# Patient Record
Sex: FEMALE | Race: WHITE | Employment: UNEMPLOYED | URBAN - METROPOLITAN AREA
[De-identification: names, ages, dates, MRNs, and addresses within clinical notes are randomized per-mention and may not be internally consistent; named-entity substitution may affect disease eponyms.]

---

## 2017-01-14 ENCOUNTER — HOSPITAL ENCOUNTER (EMERGENCY)
Facility: HOSPITAL | Age: 62
Discharge: HOME | End: 2017-01-14
Attending: EMERGENCY MEDICINE
Payer: MEDICAID

## 2017-01-14 ENCOUNTER — APPOINTMENT (OUTPATIENT)
Dept: GENERAL RADIOLOGY | Facility: HOSPITAL | Age: 62
End: 2017-01-14
Attending: EMERGENCY MEDICINE
Payer: MEDICAID

## 2017-01-14 VITALS
BODY MASS INDEX: 24.39 KG/M2 | RESPIRATION RATE: 18 BRPM | HEIGHT: 65.75 IN | DIASTOLIC BLOOD PRESSURE: 79 MMHG | WEIGHT: 149.94 LBS | HEART RATE: 86 BPM | SYSTOLIC BLOOD PRESSURE: 126 MMHG | TEMPERATURE: 100 F | OXYGEN SATURATION: 96 %

## 2017-01-14 DIAGNOSIS — J18.9 COMMUNITY ACQUIRED PNEUMONIA: Primary | ICD-10-CM

## 2017-01-14 PROCEDURE — 71020 XR CHEST PA + LAT CHEST (CPT=71020): CPT

## 2017-01-14 PROCEDURE — 99283 EMERGENCY DEPT VISIT LOW MDM: CPT

## 2017-01-14 RX ORDER — ALBUTEROL SULFATE 90 UG/1
2 AEROSOL, METERED RESPIRATORY (INHALATION) EVERY 4 HOURS PRN
Qty: 1 INHALER | Refills: 0 | Status: SHIPPED | OUTPATIENT
Start: 2017-01-14 | End: 2017-02-13

## 2017-01-14 RX ORDER — AZITHROMYCIN 250 MG/1
TABLET, FILM COATED ORAL
Qty: 1 PACKAGE | Refills: 0 | Status: SHIPPED | OUTPATIENT
Start: 2017-01-14 | End: 2017-01-19

## 2017-01-15 NOTE — ED NOTES
Presents with persistent cough and fevers at home for past few weeks, productive with yellowish phlegm, no recent travel, no chest pain.

## 2017-01-15 NOTE — ED PROVIDER NOTES
Patient Seen in: [facility] Emergency Department    History   Patient presents with:  Cough/URI  Fever Sepsis (infectious)    Stated Complaint: COUGH, CHEST CONGESTION, FEVER    HPI  Patient is a 19-year-old female who states over the past 2 weeks s Temp(Src) 99.5 °F (37.5 °C) (Oral)  Resp 18  Ht 167 cm (5' 5.75\")  Wt 68 kg  BMI 24.38 kg/m2  SpO2 96%        Physical Exam  GENERAL: Patient resting comfortably on the cart in no acute distress.   HEENT: Extraocular muscles intact, pupils equal round reac

## 2017-02-10 ENCOUNTER — APPOINTMENT (OUTPATIENT)
Dept: GENERAL RADIOLOGY | Age: 62
End: 2017-02-10
Attending: FAMILY MEDICINE
Payer: MEDICAID

## 2017-02-10 ENCOUNTER — HOSPITAL ENCOUNTER (OUTPATIENT)
Age: 62
Discharge: HOME | End: 2017-02-10
Attending: FAMILY MEDICINE
Payer: MEDICAID

## 2017-02-10 VITALS
DIASTOLIC BLOOD PRESSURE: 67 MMHG | OXYGEN SATURATION: 96 % | HEART RATE: 68 BPM | SYSTOLIC BLOOD PRESSURE: 118 MMHG | TEMPERATURE: 98 F | RESPIRATION RATE: 20 BRPM

## 2017-02-10 DIAGNOSIS — S86.912A KNEE STRAIN, LEFT, INITIAL ENCOUNTER: Primary | ICD-10-CM

## 2017-02-10 DIAGNOSIS — M19.90 OSTEOARTHRITIS, UNSPECIFIED OSTEOARTHRITIS TYPE, UNSPECIFIED SITE: ICD-10-CM

## 2017-02-10 PROCEDURE — 99213 OFFICE O/P EST LOW 20 MIN: CPT

## 2017-02-10 PROCEDURE — 73560 X-RAY EXAM OF KNEE 1 OR 2: CPT

## 2017-02-10 NOTE — ED PROVIDER NOTES
Patient Seen in: [facility]    History   Patient presents with:  Knee Pain    Stated Complaint: KNEE PAIN LEFT SIDE    HPI  80-year-old female coming in with daughter, declining the need for , once daughter to transla Current:/67 mmHg  Pulse 68  Temp(Src) 97.6 °F (36.4 °C) (Temporal)  Resp 20  SpO2 96%        Physical Exam  General appearance: alert, appears stated age and cooperative  Neck: no adenopathy and supple, symmetrical, trachea midline  Lungs: No t tightness and pressure at proximal end on anterior side. FINDINGS:  No acute fractures or osseous lesions. Patellar femoral relationship is within normal limits. Small joint effusion. Mild osteoarthritic changes.        2/10/2017  CONCLUSION:  No acute f

## 2017-02-10 NOTE — ED INITIAL ASSESSMENT (HPI)
Here for eval of left knee pain that occurred in the fall while bending down, sts that she felt a \"pop\". Sts that now it is still pinching.

## 2018-07-14 ENCOUNTER — APPOINTMENT (OUTPATIENT)
Dept: GENERAL RADIOLOGY | Age: 63
End: 2018-07-14
Attending: FAMILY MEDICINE
Payer: MEDICAID

## 2018-07-14 ENCOUNTER — HOSPITAL ENCOUNTER (OUTPATIENT)
Age: 63
Discharge: HOME | End: 2018-07-14
Attending: FAMILY MEDICINE
Payer: MEDICAID

## 2018-07-14 VITALS
OXYGEN SATURATION: 95 % | HEART RATE: 67 BPM | SYSTOLIC BLOOD PRESSURE: 124 MMHG | RESPIRATION RATE: 18 BRPM | TEMPERATURE: 98 F | DIASTOLIC BLOOD PRESSURE: 80 MMHG

## 2018-07-14 DIAGNOSIS — S92.514A CLOSED NONDISPLACED FRACTURE OF PROXIMAL PHALANX OF LESSER TOE OF RIGHT FOOT, INITIAL ENCOUNTER: Primary | ICD-10-CM

## 2018-07-14 PROCEDURE — 73660 X-RAY EXAM OF TOE(S): CPT | Performed by: FAMILY MEDICINE

## 2018-07-14 PROCEDURE — 28510 TREATMENT OF TOE FRACTURE: CPT

## 2018-07-14 PROCEDURE — 99213 OFFICE O/P EST LOW 20 MIN: CPT

## 2018-07-14 NOTE — ED INITIAL ASSESSMENT (HPI)
Patient presents with complaints of a right toe injury this past Thursday. States that she hit her 3 rd right toe on a wooden door. CMS adequate.

## 2018-07-14 NOTE — ED PROVIDER NOTES
Patient Seen in: [facility]    History   Patient presents with:  Lower Extremity Injury (musculoskeletal): right 3rd toe injury    Stated Complaint: RIGHT MIDDLE TOE SWOLLEN/BRUISED    HPI    This 51-year-old female presents to and swelling at the proximal phalanx of the right third toe. CMS is otherwise intact to the right foot. She has no tenderness over the metatarsals. She has normal range of motion of the right ankle and knee. SKIN: Warm and dry.       ED Course   Labs Re diagnosis)    Disposition:  Discharge  7/14/2018  4:30 pm    Follow-up:  Gutierrez Castañeda DO  [address]  [phone]    Schedule an appointment as soon as possible for a visit in 1 week  If symptoms worsen        Medicati

## 2018-12-05 ENCOUNTER — HOSPITAL ENCOUNTER (OUTPATIENT)
Age: 63
Discharge: HOME | End: 2018-12-05
Attending: EMERGENCY MEDICINE
Payer: MEDICAID

## 2018-12-05 VITALS
HEIGHT: 65.35 IN | HEART RATE: 86 BPM | WEIGHT: 141.13 LBS | DIASTOLIC BLOOD PRESSURE: 74 MMHG | TEMPERATURE: 98 F | OXYGEN SATURATION: 97 % | BODY MASS INDEX: 23.23 KG/M2 | RESPIRATION RATE: 18 BRPM | SYSTOLIC BLOOD PRESSURE: 126 MMHG

## 2018-12-05 DIAGNOSIS — J40 BRONCHITIS: Primary | ICD-10-CM

## 2018-12-05 DIAGNOSIS — J98.01 BRONCHOSPASM: ICD-10-CM

## 2018-12-05 PROCEDURE — 99214 OFFICE O/P EST MOD 30 MIN: CPT

## 2018-12-05 PROCEDURE — 99213 OFFICE O/P EST LOW 20 MIN: CPT

## 2018-12-05 RX ORDER — ALBUTEROL SULFATE 90 UG/1
2 AEROSOL, METERED RESPIRATORY (INHALATION) EVERY 4 HOURS PRN
Qty: 1 INHALER | Refills: 0 | Status: SHIPPED | OUTPATIENT
Start: 2018-12-05 | End: 2019-01-04

## 2018-12-05 RX ORDER — AZITHROMYCIN 250 MG/1
TABLET, FILM COATED ORAL
Qty: 1 PACKAGE | Refills: 0 | Status: SHIPPED | OUTPATIENT
Start: 2018-12-05 | End: 2018-12-10 | Stop reason: ALTCHOICE

## 2018-12-06 NOTE — ED INITIAL ASSESSMENT (HPI)
Patient presents with cc of cough productive of green since Saturday. H/o pneumonia. Tmax 100.9 at home. Did get flu shot.

## 2018-12-06 NOTE — ED PROVIDER NOTES
Patient Seen in: [facility]    History   No chief complaint on file. Stated Complaint: fever, cough    HPI    Patient was seen in the emergency department in January 2017 with productive cough and fever.   Patient is a former There is wheeze on forced expiration with cough  Heart: Normal S1 and S2, without murmur or rub. Neurologic:  Mental status as above.   Patient moves all extremities with good strength        ED Course   Labs Reviewed - No data to display     Patient lik

## 2018-12-10 PROCEDURE — 88175 CYTOPATH C/V AUTO FLUID REDO: CPT | Performed by: FAMILY MEDICINE

## 2018-12-10 PROCEDURE — 87624 HPV HI-RISK TYP POOLED RSLT: CPT | Performed by: FAMILY MEDICINE

## 2019-05-16 PROCEDURE — 86803 HEPATITIS C AB TEST: CPT | Performed by: FAMILY MEDICINE

## 2023-05-08 ENCOUNTER — APPOINTMENT (OUTPATIENT)
Dept: GENERAL RADIOLOGY | Age: 68
End: 2023-05-08
Attending: NURSE PRACTITIONER
Payer: MEDICAID

## 2023-05-08 ENCOUNTER — HOSPITAL ENCOUNTER (OUTPATIENT)
Age: 68
Discharge: HOME | End: 2023-05-08
Payer: MEDICAID

## 2023-05-08 VITALS
DIASTOLIC BLOOD PRESSURE: 68 MMHG | BODY MASS INDEX: 22 KG/M2 | HEART RATE: 82 BPM | RESPIRATION RATE: 18 BRPM | OXYGEN SATURATION: 95 % | WEIGHT: 130 LBS | TEMPERATURE: 98 F | SYSTOLIC BLOOD PRESSURE: 130 MMHG

## 2023-05-08 DIAGNOSIS — J18.9 LINGULAR PNEUMONIA: Primary | ICD-10-CM

## 2023-05-08 LAB
POCT INFLUENZA A: NEGATIVE
POCT INFLUENZA B: NEGATIVE
SARS-COV-2 RNA RESP QL NAA+PROBE: NOT DETECTED

## 2023-05-08 PROCEDURE — 87502 INFLUENZA DNA AMP PROBE: CPT | Performed by: EMERGENCY MEDICINE

## 2023-05-08 PROCEDURE — 99204 OFFICE O/P NEW MOD 45 MIN: CPT

## 2023-05-08 PROCEDURE — 94640 AIRWAY INHALATION TREATMENT: CPT

## 2023-05-08 PROCEDURE — 71046 X-RAY EXAM CHEST 2 VIEWS: CPT | Performed by: NURSE PRACTITIONER

## 2023-05-08 RX ORDER — ALBUTEROL SULFATE 90 UG/1
2 AEROSOL, METERED RESPIRATORY (INHALATION) ONCE
Status: COMPLETED | OUTPATIENT
Start: 2023-05-08 | End: 2023-05-08

## 2023-05-08 RX ORDER — AZITHROMYCIN 250 MG/1
TABLET, FILM COATED ORAL
Qty: 6 TABLET | Refills: 0 | Status: SHIPPED | OUTPATIENT
Start: 2023-05-08 | End: 2023-05-13

## 2023-05-08 RX ORDER — AMOXICILLIN AND CLAVULANATE POTASSIUM 875; 125 MG/1; MG/1
1 TABLET, FILM COATED ORAL 2 TIMES DAILY
Qty: 14 TABLET | Refills: 0 | Status: SHIPPED | OUTPATIENT
Start: 2023-05-08 | End: 2023-05-15

## 2023-05-08 NOTE — DISCHARGE INSTRUCTIONS
Take an over-the-counter probiotic daily while on the antibiotics. Take the other inhaler, 2 puffs every 4 hours as needed for cough/chest congestion. Call your doctor in 2 days for follow-up. Please read the attached discharge instructions. Go to your nearest ER for new or worsening symptoms.

## 2023-05-08 NOTE — ED INITIAL ASSESSMENT (HPI)
Pt here c/o cough for last 5 days. Congestion. States has a fever at night, 100.4. Took ibuprofen this am.   Pain to chest when coughing. Denies any sob.

## 2023-05-27 ENCOUNTER — HOSPITAL ENCOUNTER (OUTPATIENT)
Age: 68
Discharge: ANOTHER HOSPITAL | End: 2023-05-27
Attending: EMERGENCY MEDICINE
Payer: MEDICAID

## 2023-05-27 ENCOUNTER — APPOINTMENT (OUTPATIENT)
Dept: GENERAL RADIOLOGY | Facility: HOSPITAL | Age: 68
End: 2023-05-27
Payer: MEDICAID

## 2023-05-27 ENCOUNTER — HOSPITAL ENCOUNTER (EMERGENCY)
Facility: HOSPITAL | Age: 68
Discharge: HOME | End: 2023-05-27
Attending: STUDENT IN AN ORGANIZED HEALTH CARE EDUCATION/TRAINING PROGRAM
Payer: MEDICAID

## 2023-05-27 VITALS
DIASTOLIC BLOOD PRESSURE: 82 MMHG | BODY MASS INDEX: 24.02 KG/M2 | RESPIRATION RATE: 14 BRPM | SYSTOLIC BLOOD PRESSURE: 113 MMHG | TEMPERATURE: 98 F | HEIGHT: 65.75 IN | WEIGHT: 147.69 LBS | HEART RATE: 68 BPM | OXYGEN SATURATION: 99 %

## 2023-05-27 VITALS
DIASTOLIC BLOOD PRESSURE: 66 MMHG | OXYGEN SATURATION: 95 % | HEART RATE: 102 BPM | RESPIRATION RATE: 18 BRPM | SYSTOLIC BLOOD PRESSURE: 116 MMHG | TEMPERATURE: 98 F

## 2023-05-27 DIAGNOSIS — R07.89 CHEST PRESSURE: Primary | ICD-10-CM

## 2023-05-27 DIAGNOSIS — R07.9 CHEST PAIN OF UNCERTAIN ETIOLOGY: Primary | ICD-10-CM

## 2023-05-27 LAB
ALBUMIN SERPL-MCNC: 3.8 G/DL (ref 3.4–5)
ALBUMIN/GLOB SERPL: 1 {RATIO} (ref 1–2)
ALP LIVER SERPL-CCNC: 72 U/L
ALT SERPL-CCNC: 13 U/L
ANION GAP SERPL CALC-SCNC: 4 MMOL/L (ref 0–18)
AST SERPL-CCNC: 17 U/L (ref 15–37)
BASOPHILS # BLD AUTO: 0.03 X10(3) UL (ref 0–0.2)
BASOPHILS NFR BLD AUTO: 0.5 %
BILIRUB SERPL-MCNC: 1.1 MG/DL (ref 0.1–2)
BUN BLD-MCNC: 17 MG/DL (ref 7–18)
CALCIUM BLD-MCNC: 9.3 MG/DL (ref 8.5–10.1)
CHLORIDE SERPL-SCNC: 109 MMOL/L (ref 98–112)
CO2 SERPL-SCNC: 26 MMOL/L (ref 21–32)
CREAT BLD-MCNC: 0.92 MG/DL
EOSINOPHIL # BLD AUTO: 0.07 X10(3) UL (ref 0–0.7)
EOSINOPHIL NFR BLD AUTO: 1.2 %
ERYTHROCYTE [DISTWIDTH] IN BLOOD BY AUTOMATED COUNT: 12.1 %
GFR SERPLBLD BASED ON 1.73 SQ M-ARVRAT: 68 ML/MIN/1.73M2 (ref 60–?)
GLOBULIN PLAS-MCNC: 3.7 G/DL (ref 2.8–4.4)
GLUCOSE BLD-MCNC: 96 MG/DL (ref 70–99)
HCT VFR BLD AUTO: 42.4 %
HGB BLD-MCNC: 13.8 G/DL
IMM GRANULOCYTES # BLD AUTO: 0.01 X10(3) UL (ref 0–1)
IMM GRANULOCYTES NFR BLD: 0.2 %
LYMPHOCYTES # BLD AUTO: 1.92 X10(3) UL (ref 1–4)
LYMPHOCYTES NFR BLD AUTO: 33 %
MCH RBC QN AUTO: 28.8 PG (ref 26–34)
MCHC RBC AUTO-ENTMCNC: 32.5 G/DL (ref 31–37)
MCV RBC AUTO: 88.3 FL
MONOCYTES # BLD AUTO: 0.55 X10(3) UL (ref 0.1–1)
MONOCYTES NFR BLD AUTO: 9.5 %
NEUTROPHILS # BLD AUTO: 3.24 X10 (3) UL (ref 1.5–7.7)
NEUTROPHILS # BLD AUTO: 3.24 X10(3) UL (ref 1.5–7.7)
NEUTROPHILS NFR BLD AUTO: 55.6 %
OSMOLALITY SERPL CALC.SUM OF ELEC: 289 MOSM/KG (ref 275–295)
PLATELET # BLD AUTO: 301 10(3)UL (ref 150–450)
POTASSIUM SERPL-SCNC: 4.1 MMOL/L (ref 3.5–5.1)
PROT SERPL-MCNC: 7.5 G/DL (ref 6.4–8.2)
RBC # BLD AUTO: 4.8 X10(6)UL
SODIUM SERPL-SCNC: 139 MMOL/L (ref 136–145)
TROPONIN I HIGH SENSITIVITY: <3 NG/L
WBC # BLD AUTO: 5.8 X10(3) UL (ref 4–11)

## 2023-05-27 PROCEDURE — 99284 EMERGENCY DEPT VISIT MOD MDM: CPT

## 2023-05-27 PROCEDURE — 99213 OFFICE O/P EST LOW 20 MIN: CPT

## 2023-05-27 PROCEDURE — 71045 X-RAY EXAM CHEST 1 VIEW: CPT | Performed by: STUDENT IN AN ORGANIZED HEALTH CARE EDUCATION/TRAINING PROGRAM

## 2023-05-27 PROCEDURE — 36415 COLL VENOUS BLD VENIPUNCTURE: CPT

## 2023-05-27 PROCEDURE — 93010 ELECTROCARDIOGRAM REPORT: CPT

## 2023-05-27 PROCEDURE — 84484 ASSAY OF TROPONIN QUANT: CPT | Performed by: STUDENT IN AN ORGANIZED HEALTH CARE EDUCATION/TRAINING PROGRAM

## 2023-05-27 PROCEDURE — 93005 ELECTROCARDIOGRAM TRACING: CPT

## 2023-05-27 PROCEDURE — 85025 COMPLETE CBC W/AUTO DIFF WBC: CPT | Performed by: STUDENT IN AN ORGANIZED HEALTH CARE EDUCATION/TRAINING PROGRAM

## 2023-05-27 PROCEDURE — 99285 EMERGENCY DEPT VISIT HI MDM: CPT

## 2023-05-27 PROCEDURE — 80053 COMPREHEN METABOLIC PANEL: CPT | Performed by: STUDENT IN AN ORGANIZED HEALTH CARE EDUCATION/TRAINING PROGRAM

## 2023-05-27 NOTE — ED INITIAL ASSESSMENT (HPI)
Pain and pressure at the centre of the chest since Thursday increased pain while walking . Mild shortness of breath. History of pneumonia 2 weeks ago and completed antibiotic.

## 2023-05-27 NOTE — ED INITIAL ASSESSMENT (HPI)
Non radiating sternal chest pressure since Thursday. Denies N/V, dizziness, shortness of breath. Dx with pneumonia early in May, completed full round of antibiotics.

## 2023-05-28 LAB
ATRIAL RATE: 60 BPM
ATRIAL RATE: 70 BPM
P AXIS: -29 DEGREES
P AXIS: 37 DEGREES
P-R INTERVAL: 132 MS
P-R INTERVAL: 134 MS
Q-T INTERVAL: 388 MS
Q-T INTERVAL: 398 MS
QRS DURATION: 76 MS
QRS DURATION: 82 MS
QTC CALCULATION (BEZET): 398 MS
QTC CALCULATION (BEZET): 419 MS
R AXIS: 70 DEGREES
R AXIS: 76 DEGREES
T AXIS: 60 DEGREES
T AXIS: 72 DEGREES
VENTRICULAR RATE: 60 BPM
VENTRICULAR RATE: 70 BPM

## 2023-06-20 ENCOUNTER — HOSPITAL ENCOUNTER (OUTPATIENT)
Dept: CT IMAGING | Age: 68
Discharge: HOME | End: 2023-06-20
Attending: INTERNAL MEDICINE
Payer: MEDICAID

## 2023-06-20 DIAGNOSIS — J44.9 ASTHMA-COPD OVERLAP SYNDROME (HCC): ICD-10-CM

## 2023-06-20 DIAGNOSIS — R93.89 ABNORMAL CXR: ICD-10-CM

## 2023-06-20 PROCEDURE — 71260 CT THORAX DX C+: CPT | Performed by: INTERNAL MEDICINE

## 2024-06-25 ENCOUNTER — V-VISIT (OUTPATIENT)
Dept: URGENT CARE | Age: 69
End: 2024-06-25

## 2024-06-25 VITALS
DIASTOLIC BLOOD PRESSURE: 64 MMHG | WEIGHT: 143.3 LBS | OXYGEN SATURATION: 95 % | HEART RATE: 76 BPM | TEMPERATURE: 98.3 F | SYSTOLIC BLOOD PRESSURE: 90 MMHG | BODY MASS INDEX: 23.88 KG/M2 | HEIGHT: 65 IN | RESPIRATION RATE: 16 BRPM

## 2024-06-25 DIAGNOSIS — J01.10 ACUTE FRONTAL SINUSITIS, RECURRENCE NOT SPECIFIED: Primary | ICD-10-CM

## 2024-06-25 LAB
FLUAV AG UPPER RESP QL IA.RAPID: NEGATIVE
FLUBV AG UPPER RESP QL IA.RAPID: NEGATIVE
SARS-COV+SARS-COV-2 AG RESP QL IA.RAPID: NOT DETECTED
TEST LOT EXPIRATION DATE: NORMAL
TEST LOT NUMBER: NORMAL

## 2024-06-25 PROCEDURE — 87428 SARSCOV & INF VIR A&B AG IA: CPT | Performed by: NURSE PRACTITIONER

## 2024-06-25 PROCEDURE — 99203 OFFICE O/P NEW LOW 30 MIN: CPT | Performed by: NURSE PRACTITIONER

## 2024-06-25 RX ORDER — AMOXICILLIN AND CLAVULANATE POTASSIUM 875; 125 MG/1; MG/1
1 TABLET, FILM COATED ORAL 2 TIMES DAILY
Qty: 14 TABLET | Refills: 0 | Status: SHIPPED | OUTPATIENT
Start: 2024-06-25 | End: 2024-07-02

## 2024-06-25 RX ORDER — TELMISARTAN 20 MG/1
20 TABLET ORAL DAILY
COMMUNITY

## 2024-06-28 ENCOUNTER — TELEPHONE (OUTPATIENT)
Dept: FAMILY MEDICINE | Age: 69
End: 2024-06-28

## (undated) NOTE — ED AVS SNAPSHOT
[facility] in [address] [place] [address] Drive,[address]    [address] [place] [address]    Phone:  [phone]    Fax:  [phone]           Avon Ralph   MRN: [id number]    Department:  [facility] [place]   Date of Visit:  2/10/2017 To Check ER Wait Times:  TEXT 'ERwait' to 64516      Click [email or web address]      Or call [phone]    If you have any problems with your follow-up, please call our  at [phone].     Si usted tiene algun problema con vallejo sequimiento, por I have read and understand the instructions given to me by my caregivers. [facility]        Pharmacy Address Phone Number   [address] [facility] ([address]) [address] [place] [address].  [facility] & CONCLUSION:  No acute fractures. Mild osteoarthritic changes.            Dictated by: Jeaneth Ness MD on 2/10/2017 at 10:11       Approved by: Jeaneth Ness MD              Narrative:    PROCEDURE:  XR KNEE (1 OR 2 VIEWS), LEFT (CPT=73560)     COMPARI

## (undated) NOTE — ED AVS SNAPSHOT
[facility] Emergency Department    [place]  [address] [place] [address]    Phone:  [phone]    Fax:  [phone]           Manisha Jordon   MRN: [id number]    Department:  [facility] Emergency Department   Date of Visit:  1/1 IF THERE IS ANY CHANGE OR WORSENING OF YOUR CONDITION, CALL YOUR PRIMARY CARE PHYSICIAN AT ONCE OR RETURN IMMEDIATELY TO THE EMERGENCY DEPARTMENT.     If you have been prescribed any medication(s), please fill your prescription right away and begin taking t

## (undated) NOTE — ED AVS SNAPSHOT
[facility] Emergency Department    [facility]  [address] [address]    Phone:  [phone]    Fax:  [phone]           Stanley Morgan   MRN: [id number]    Department:  [facility] Emergency Department   Date of Visit:  1/1 coverage for follow-up care and referrals. [address] [facility] [employer] [place] [phone]  Pediatric [phone] Emergency Department   [phone]       To Check ER Wait Times:  TEXT 'ERwait' to [phone]      Click [email or web address] will be contacted. Please make sure we have your correct phone number before you leave. After you leave, you should follow the attached instructions. I have read and understand the instructions given to me by my caregivers.         [facility] XR CHEST PA + LAT CHEST (CPT=71020) (Final result) Result time:  01/14/17 20:07:51    Final result    Impression:    CONCLUSION:  Moderate bronchitis with left lower lobe pneumonia. Minimal interstitial changes right midlung.            Dictated by: Sasha Hahn medical emergencies, dial 911.